# Patient Record
Sex: MALE | Race: WHITE | NOT HISPANIC OR LATINO | Employment: OTHER | URBAN - NONMETROPOLITAN AREA
[De-identification: names, ages, dates, MRNs, and addresses within clinical notes are randomized per-mention and may not be internally consistent; named-entity substitution may affect disease eponyms.]

---

## 2023-02-14 DIAGNOSIS — N20.0 KIDNEY STONE: Primary | ICD-10-CM

## 2023-02-14 NOTE — H&P (VIEW-ONLY)
"Chief Complaint  Left renal calculus  Subjective          Shon Ricks presents to [facility] [place]   Patient has left renal calculus.  She is sent here today by MELINA Saini  in [place].  5 days ago he presented to the emergency room with colic.  He did get IV analgesics which controlled his pain.  He had a CT which I will discuss below.  Presently he is in no pain at all.  He has not had any gross hematuria or fever.        Current Outpatient Medications:   •  tamsulosin (FLOMAX) 0.4 MG capsule 24 hr capsule, TAKE 1 CAPSULE BY MOUTH EVERY DAY 30 MINUTES AFTER THE SAME MEAL EACH DAY, Disp: , Rfl:   Past Medical History:   Diagnosis Date   • Kidney stone 2007    First occurrence, second 2016     History reviewed. No pertinent surgical history.        Review of Systems  Review  of systems  Constitutional: Negative for chills and fever.   Gastrointestinal: Negative for abdominal pain, anal bleeding and blood in stool.   Genitourinary: Negative for flank pain and hematuria.      Objective   PHYSICAL EXAM  Vital Signs:   Ht 185.4 cm (73\")   Wt 91.7 kg (202 lb 3.2 oz)   BMI 26.68 kg/m²     Physical Exam  Constitutional: Patient is without distress or deformity.  Vital signs are reviewed as above.    Neuro: No confusion; No disorientation; Alert and oriented  Pulmonary: No respiratory distress.   Skin: No pallor or diaphoresis  GI: Soft. The patient exhibits no distension and no mass. There is no tenderness. There is no rebound and no guarding. No hernia.   : No CVA tenderness over kidneys.  Bladder not palpably distended.  Psychiatric:  normal mood and affect. Not agitated.           DATA  Result Review :              Results for orders placed or performed in visit on 02/16/23   POC Urinalysis Dipstick, Multipro    Specimen: Urine   Result Value Ref Range    Color Yellow Yellow, Straw, Dark Yellow, Vera    Clarity, UA Clear Clear    Glucose, UA Negative Negative mg/dL    Bilirubin " "Negative Negative    Ketones, UA Negative Negative    Specific Gravity  1.025 1.005 - 1.030    Blood, UA Large (A) Negative    pH, Urine 5.5 5.0 - 8.0    Protein, POC Trace (A) Negative mg/dL    Urobilinogen, UA Normal Normal, 0.2 E.U./dL    Nitrite, UA Negative Negative    Leukocytes Negative Negative               XR Abdomen KUB (02/16/2023 07:58)    The images for the above \"link(s)\" were made available to me to review independently.  I also reviewed the radiologist's report described above with regard to the urologic findings. My interpretation is as follows:  This stone is radiopaque.   /Rigo Stewart MD           ASSESSMENT AND PLAN          Problem List Items Addressed This Visit    None  Visit Diagnoses     Renal calculus, left    -  Primary    Relevant Orders    POC Urinalysis Dipstick, Multipro (Completed)    Case Request (Completed)      The patient has a left renal calculus.   Additional evaluation will include a postoperative kub to assess stone free status, stone analysis if patient able to strain for stone, and dietary assessment with potential recommendations for pharmacologic therapy as indicated. The radiographs, including size, location, and symptoms are discussed with the patient.  Options for management including ureteroscopic, open, and percutaneous approaches are explained to the patient.  The risks, benefits, and alternatives of this procedure have been discussed with the patient or the responsible party.  Mr. Ricks  has chosen to proceed with ESWL due to its less invasive approach combined with its effectiveness.  Understanding of the risks including damage to the kidney (even long term), inability to render stone free, need for ancillary procedures, bleeding, infection, steinstrasse, post op pain and discomfort,  and possible arrhythmia is expressed by the patient.  Given the size and location of the stone, the patient will undergo placement of a ureteral stent to help with passage of " the stones and avoiding ureteral obstruction by fragments.  It is explained to the patient that the stent includes extra risks, including damage to the urethra, bladder, or ureter, along with post operative pain caused by stent colic. It is explained to the patient that the stent will have to be removed in the future and that failure to follow up for stent removal could results in infection further stone formation, permanent kidney damage, and need for further surgery..  The patient also understands the spontaneous passage of the stone is an option, or even following the stone size and location with serial imaging.     Data Reviewed and/or ordered during today's visit to evaluate and manage:   DATA ORDERED/REVIEWED THIS VISIT: URINALYSIS, CT STONE PROTOCOL and KUB        FOLLOW UP     No follow-ups on file.        (Please note that portions of this note were completed with a voice recognition program.)  Rigo Stewart MD  02/16/23  09:24 CST

## 2023-02-14 NOTE — PROGRESS NOTES
"Chief Complaint  Left renal calculus  Subjective          Shon Ricks presents to [facility] [place]   Patient has left renal calculus.  She is sent here today by MELINA Saini  in [place].  5 days ago he presented to the emergency room with colic.  He did get IV analgesics which controlled his pain.  He had a CT which I will discuss below.  Presently he is in no pain at all.  He has not had any gross hematuria or fever.        Current Outpatient Medications:   •  tamsulosin (FLOMAX) 0.4 MG capsule 24 hr capsule, TAKE 1 CAPSULE BY MOUTH EVERY DAY 30 MINUTES AFTER THE SAME MEAL EACH DAY, Disp: , Rfl:   Past Medical History:   Diagnosis Date   • Kidney stone 2007    First occurrence, second 2016     History reviewed. No pertinent surgical history.        Review of Systems  Review  of systems  Constitutional: Negative for chills and fever.   Gastrointestinal: Negative for abdominal pain, anal bleeding and blood in stool.   Genitourinary: Negative for flank pain and hematuria.      Objective   PHYSICAL EXAM  Vital Signs:   Ht 185.4 cm (73\")   Wt 91.7 kg (202 lb 3.2 oz)   BMI 26.68 kg/m²     Physical Exam  Constitutional: Patient is without distress or deformity.  Vital signs are reviewed as above.    Neuro: No confusion; No disorientation; Alert and oriented  Pulmonary: No respiratory distress.   Skin: No pallor or diaphoresis  GI: Soft. The patient exhibits no distension and no mass. There is no tenderness. There is no rebound and no guarding. No hernia.   : No CVA tenderness over kidneys.  Bladder not palpably distended.  Psychiatric:  normal mood and affect. Not agitated.           DATA  Result Review :              Results for orders placed or performed in visit on 02/16/23   POC Urinalysis Dipstick, Multipro    Specimen: Urine   Result Value Ref Range    Color Yellow Yellow, Straw, Dark Yellow, Vera    Clarity, UA Clear Clear    Glucose, UA Negative Negative mg/dL    Bilirubin " "Negative Negative    Ketones, UA Negative Negative    Specific Gravity  1.025 1.005 - 1.030    Blood, UA Large (A) Negative    pH, Urine 5.5 5.0 - 8.0    Protein, POC Trace (A) Negative mg/dL    Urobilinogen, UA Normal Normal, 0.2 E.U./dL    Nitrite, UA Negative Negative    Leukocytes Negative Negative               XR Abdomen KUB (02/16/2023 07:58)    The images for the above \"link(s)\" were made available to me to review independently.  I also reviewed the radiologist's report described above with regard to the urologic findings. My interpretation is as follows:  This stone is radiopaque.   /Rigo Stewart MD           ASSESSMENT AND PLAN          Problem List Items Addressed This Visit    None  Visit Diagnoses     Renal calculus, left    -  Primary    Relevant Orders    POC Urinalysis Dipstick, Multipro (Completed)    Case Request (Completed)      The patient has a left renal calculus.   Additional evaluation will include a postoperative kub to assess stone free status, stone analysis if patient able to strain for stone, and dietary assessment with potential recommendations for pharmacologic therapy as indicated. The radiographs, including size, location, and symptoms are discussed with the patient.  Options for management including ureteroscopic, open, and percutaneous approaches are explained to the patient.  The risks, benefits, and alternatives of this procedure have been discussed with the patient or the responsible party.  Mr. Ricks  has chosen to proceed with ESWL due to its less invasive approach combined with its effectiveness.  Understanding of the risks including damage to the kidney (even long term), inability to render stone free, need for ancillary procedures, bleeding, infection, steinstrasse, post op pain and discomfort,  and possible arrhythmia is expressed by the patient.  Given the size and location of the stone, the patient will undergo placement of a ureteral stent to help with passage of " the stones and avoiding ureteral obstruction by fragments.  It is explained to the patient that the stent includes extra risks, including damage to the urethra, bladder, or ureter, along with post operative pain caused by stent colic. It is explained to the patient that the stent will have to be removed in the future and that failure to follow up for stent removal could results in infection further stone formation, permanent kidney damage, and need for further surgery..  The patient also understands the spontaneous passage of the stone is an option, or even following the stone size and location with serial imaging.     Data Reviewed and/or ordered during today's visit to evaluate and manage:   DATA ORDERED/REVIEWED THIS VISIT: URINALYSIS, CT STONE PROTOCOL and KUB        FOLLOW UP     No follow-ups on file.        (Please note that portions of this note were completed with a voice recognition program.)  Rigo Stewart MD  02/16/23  09:24 CST

## 2023-02-16 ENCOUNTER — PRE-ADMISSION TESTING (OUTPATIENT)
Dept: PREADMISSION TESTING | Facility: HOSPITAL | Age: 58
End: 2023-02-16
Payer: COMMERCIAL

## 2023-02-16 ENCOUNTER — OFFICE VISIT (OUTPATIENT)
Dept: UROLOGY | Facility: CLINIC | Age: 58
End: 2023-02-16
Payer: COMMERCIAL

## 2023-02-16 ENCOUNTER — HOSPITAL ENCOUNTER (OUTPATIENT)
Dept: GENERAL RADIOLOGY | Facility: HOSPITAL | Age: 58
Discharge: HOME | End: 2023-02-16
Payer: COMMERCIAL

## 2023-02-16 VITALS
HEART RATE: 70 BPM | SYSTOLIC BLOOD PRESSURE: 147 MMHG | RESPIRATION RATE: 18 BRPM | OXYGEN SATURATION: 98 % | HEIGHT: 73 IN | DIASTOLIC BLOOD PRESSURE: 103 MMHG | BODY MASS INDEX: 26.79 KG/M2 | WEIGHT: 202.16 LBS

## 2023-02-16 VITALS — BODY MASS INDEX: 26.8 KG/M2 | WEIGHT: 202.2 LBS | HEIGHT: 73 IN

## 2023-02-16 DIAGNOSIS — N20.0 KIDNEY STONE: ICD-10-CM

## 2023-02-16 DIAGNOSIS — N20.0 RENAL CALCULUS, LEFT: Primary | ICD-10-CM

## 2023-02-16 LAB
BILIRUB BLD-MCNC: NEGATIVE MG/DL
CLARITY, POC: CLEAR
COLOR UR: YELLOW
DEPRECATED RDW RBC AUTO: 44.7 FL (ref 37–54)
ERYTHROCYTE [DISTWIDTH] IN BLOOD BY AUTOMATED COUNT: 12.8 % (ref 12.3–15.4)
GLUCOSE UR STRIP-MCNC: NEGATIVE MG/DL
HCT VFR BLD AUTO: 44.4 % (ref 37.5–51)
HGB BLD-MCNC: 14.2 G/DL (ref 13–17.7)
KETONES UR QL: NEGATIVE
LEUKOCYTE EST, POC: NEGATIVE
MCH RBC QN AUTO: 30.4 PG (ref 26.6–33)
MCHC RBC AUTO-ENTMCNC: 32 G/DL (ref 31.5–35.7)
MCV RBC AUTO: 95.1 FL (ref 79–97)
NITRITE UR-MCNC: NEGATIVE MG/ML
PH UR: 5.5 [PH] (ref 5–8)
PLATELET # BLD AUTO: 212 10*3/MM3 (ref 140–450)
PMV BLD AUTO: 10.4 FL (ref 6–12)
PROT UR STRIP-MCNC: ABNORMAL MG/DL
RBC # BLD AUTO: 4.67 10*6/MM3 (ref 4.14–5.8)
RBC # UR STRIP: ABNORMAL /UL
SP GR UR: 1.02 (ref 1–1.03)
UROBILINOGEN UR QL: NORMAL
WBC NRBC COR # BLD: 4.72 10*3/MM3 (ref 3.4–10.8)

## 2023-02-16 PROCEDURE — 85027 COMPLETE CBC AUTOMATED: CPT

## 2023-02-16 PROCEDURE — 36415 COLL VENOUS BLD VENIPUNCTURE: CPT

## 2023-02-16 PROCEDURE — 99204 OFFICE O/P NEW MOD 45 MIN: CPT | Performed by: UROLOGY

## 2023-02-16 PROCEDURE — 81003 URINALYSIS AUTO W/O SCOPE: CPT | Performed by: UROLOGY

## 2023-02-16 PROCEDURE — 74018 RADEX ABDOMEN 1 VIEW: CPT

## 2023-02-16 RX ORDER — TAMSULOSIN HYDROCHLORIDE 0.4 MG/1
1 CAPSULE ORAL AS NEEDED
COMMUNITY
Start: 2023-02-13

## 2023-02-16 RX ORDER — KETOROLAC TROMETHAMINE 10 MG/1
10 TABLET, FILM COATED ORAL EVERY 6 HOURS PRN
COMMUNITY

## 2023-02-16 NOTE — DISCHARGE INSTRUCTIONS
Before you come to the hospital        Arrival time: AS DIRECTED BY OFFICE     YOU MAY TAKE THE FOLLOWING MEDICATION(S) THE MORNING OF SURGERY WITH A SIP OF WATER: ***none           ALL OTHER HOME MEDICATION CHECK WITH YOUR PHYSICIAN (especially if   you are taking diabetes medicines or blood thinners)    Do not take any Erectile Dysfunction medications (EX: CIALIS, VIAGRA) 24 hours prior to surgery.                  Eating and drinking restrictions prior to scheduled arrival time    2 Hours before arrival time STOP   Drinking Clear liquids (water, apple juice-no pulp)     6 Hours before arrival time STOP   Milk or drinks that contain milk, full liquids    6 Hours before arrival time STOP   Light meals or foods, such as toast or cereal    8 Hours before arrival time STOP   Heavy foods, such as meat, fried foods, or fatty foods    (It is extremely important that you follow these guidelines to prevent delay or cancelation of your procedure)     Clear Liquids  Water and flavored water                                                                      Clear Fruit juices, such as cranberry juice and apple juice.  Black coffee (NO cream of any kind, including powdered).  Plain tea  Clear bouillon or broth.  Flavored gelatin.  Soda.  Gatorade or Powerade.  Full liquid examples  Juices that have pulp.  Frozen ice pops that contain fruit pieces.  Coffee with creamer  Milk.  Yogurt.                MANAGING PAIN AFTER SURGERY    We know you are probably wondering what your pain will be like after surgery.  Following surgery it is unrealistic to expect you will not have pain.   Pain is how our bodies let us know that something is wrong or cautions us to be careful.  That said, our goal is to make your pain tolerable.    Methods we may use to treat your pain include (oral or IV medications, PCAs, epidurals, nerve blocks, etc.)   While some procedures require IV pain medications for a short time after surgery, transitioning to  pain medications by mouth allows for better management of pain.   Your nurse will encourage you to take oral pain medications whenever possible.  IV medications work almost immediately, but only last a short while.  Taking medications by mouth allows for a more constant level of medication in your blood stream for a longer period of time.      Once your pain is out of control it is harder to get back under control.  It is important you are aware when your next dose of pain medication is due.  If you are admitted, your nurse may write the time of your next dose on the white board in your room to help you remember.      We are interested in your pain and encourage you to inform us about aggravating factors during your visit.   Many times a simple repositioning every few hours can make a big difference.    If your physician says it is okay, do not let your pain prevent you from getting out of bed. Be sure to call your nurse for assistance prior to getting up so you do not fall.      Before surgery, please decide your tolerable pain goal.  These faces help describe the pain ratings we use on a 0-10 scale.   Be prepared to tell us your goal and whether or not you take pain or anxiety medications at home.          Preparing for Surgery  Preparing for surgery is an important part of your care. It can make things go more smoothly and help you avoid complications. The steps leading up to surgery may vary among hospitals. Follow all instructions given to you by your health care providers. Ask questions if you do not understand something. Talk about any concerns that you have.  Here are some questions to consider asking before your surgery:  If my surgery is not an emergency (is elective), when would be the best time to have the surgery?  What arrangements do I need to make for work, home, or school?  What will my recovery be like? How long will it be before I can return to normal activities?  Will I need to prepare my home?  Will I need to arrange care for me or my children?  Should I expect to have pain after surgery? What are my pain management options? Are there nonmedical options that I can try for pain?  Tell a health care provider about:  Any allergies you have.  All medicines you are taking, including vitamins, herbs, eye drops, creams, and over-the-counter medicines.  Any problems you or family members have had with anesthetic medicines.  Any blood disorders you have.  Any surgeries you have had.  Any medical conditions you have.  Whether you are pregnant or may be pregnant.  What are the risks?  The risks and complications of surgery depend on the specific procedure that you have. Discuss all the risks with your health care providers before your surgery. Ask about common surgical complications, which may include:  Infection.  Bleeding or a need for blood replacement (transfusion).  Allergic reactions to medicines.  Damage to surrounding nerves, tissues, or structures.  A blood clot.  Scarring.  Failure of the surgery to correct the problem.  Follow these instructions before the procedure:  Several days or weeks before your procedure  You may have a physical exam by your primary health care provider to make sure it is safe for you to have surgery.  You may have testing. This may include a chest X-ray, blood and urine tests, electrocardiogram (ECG), or other testing.  Ask your health care provider about:  Changing or stopping your regular medicines. This is especially important if you are taking diabetes medicines or blood thinners.  Taking medicines such as aspirin and ibuprofen. These medicines can thin your blood. Do not take these medicines unless your health care provider tells you to take them.  Taking over-the-counter medicines, vitamins, herbs, and supplements.  Do not use any products that contain nicotine or tobacco, such as cigarettes and e-cigarettes. If you need help quitting, ask your health care provider.  Avoid  alcohol.  Ask your health care provider if there are exercises you can do to prepare for surgery.  Eat a healthy diet.   Plan to have someone take you home from the hospital or clinic.  Plan to have a responsible adult care for you for at least 24 hours after you leave the hospital or clinic. This is important.  The day before your procedure  You may be given antibiotic medicine to take by mouth to help prevent infection. Take it as told by your health care provider.  You may be asked to shower with a germ-killing soap.  Follow instructions from your health care provider about eating and drinking restrictions. This includes gum, mints and hard candy.  Pack comfortable clothes according to your procedure.   The day of your procedure  You may need to take another shower with a germ-killing soap before you leave home in the morning.  With a small sip of water, take only the medicines that you are told to take.  Remove all jewelry including rings.   Leave anything you consider valuable at home except hearing aids if needed.  You do not need to bring your home medications into the hospital.   Do not wear any makeup, nail polish, powder, deodorant, lotion, hair accessories, or anything on your skin or body except your clothes.  If you will be staying in the hospital, bring a case to hold your glasses, contacts, or dentures. You may also want to bring your robe and non-skid footwear.       (Do not use denture adhesives since you will be asked to remove them during  surgery).   If you wear oxygen at home, bring it with you the day of surgery.  If instructed by your health care provider, bring your sleep apnea device with you on the day of your surgery (if this applies to you).  You may want to leave your suitcase and sleep apnea device in the car until after surgery.   Arrive at the hospital as scheduled.  Bring a friend or family member with you who can help to answer questions and be present while you meet with your  health care provider.  At the hospital  When you arrive at the hospital:  Go to registration located at the main entrance of the hospital. You will be registered and given a beeper and a sticker sheet. Take the stickers to the Outpatient nurses desk and place in the black tray. This is to notify staff that you have arrived. Then return to the lobby to wait.   When your beeper lights up and vibrates proceed through the double doors, under the stairs, and a member of the Outpatient Surgery staff will escort you to your preoperative room.  You may have to wear compression sleeves. These help to prevent blood clots and reduce swelling in your legs.  An IV may be inserted into one of your veins.              In the operating room, you may be given one or more of the following:        A medicine to help you relax (sedative).        A medicine to numb the area (local anesthetic).        A medicine to make you fall asleep (general anesthetic).        A medicine that is injected into an area of your body to numb everything below the                      injection site (regional anesthetic).  You may be given an antibiotic through your IV to help prevent infection.  Your surgical site will be marked or identified.    Contact a health care provider if you:  Develop a fever of more than 100.4°F (38°C) or other feelings of illness during the 48 hours before your surgery.  Have symptoms that get worse.  Have questions or concerns about your surgery.  Summary  Preparing for surgery can make the procedure go more smoothly and lower your risk of complications.  Before surgery, make a list of questions and concerns to discuss with your surgeon. Ask about the risks and possible complications.  In the days or weeks before your surgery, follow all instructions from your health care provider. You may need to stop smoking, avoid alcohol, follow eating restrictions, and change or stop your regular medicines.  Contact your surgeon if you  develop a fever or other signs of illness during the few days before your surgery.  This information is not intended to replace advice given to you by your health care provider. Make sure you discuss any questions you have with your health care provider.  Document Revised: 12/21/2018 Document Reviewed: 10/23/2018  Elsevier Patient Education © 2021 [employer]

## 2023-02-20 ENCOUNTER — ANESTHESIA EVENT (OUTPATIENT)
Dept: PERIOP | Facility: HOSPITAL | Age: 58
End: 2023-02-20
Payer: COMMERCIAL

## 2023-02-20 ENCOUNTER — HOSPITAL ENCOUNTER (OUTPATIENT)
Facility: HOSPITAL | Age: 58
Setting detail: HOSPITAL OUTPATIENT SURGERY
Discharge: HOME | End: 2023-02-20
Attending: UROLOGY | Admitting: UROLOGY
Payer: COMMERCIAL

## 2023-02-20 ENCOUNTER — APPOINTMENT (OUTPATIENT)
Dept: GENERAL RADIOLOGY | Facility: HOSPITAL | Age: 58
End: 2023-02-20
Payer: COMMERCIAL

## 2023-02-20 ENCOUNTER — ANESTHESIA (OUTPATIENT)
Dept: PERIOP | Facility: HOSPITAL | Age: 58
End: 2023-02-20
Payer: COMMERCIAL

## 2023-02-20 VITALS
HEART RATE: 52 BPM | TEMPERATURE: 97.2 F | RESPIRATION RATE: 20 BRPM | OXYGEN SATURATION: 100 % | SYSTOLIC BLOOD PRESSURE: 154 MMHG | DIASTOLIC BLOOD PRESSURE: 101 MMHG

## 2023-02-20 DIAGNOSIS — N20.0 RENAL CALCULUS, LEFT: ICD-10-CM

## 2023-02-20 DIAGNOSIS — N20.0 KIDNEY STONE: Primary | ICD-10-CM

## 2023-02-20 PROCEDURE — 25010000002 CEFAZOLIN PER 500 MG: Performed by: UROLOGY

## 2023-02-20 PROCEDURE — 52332 CYSTOSCOPY AND TREATMENT: CPT | Performed by: UROLOGY

## 2023-02-20 PROCEDURE — C2617 STENT, NON-COR, TEM W/O DEL: HCPCS | Performed by: UROLOGY

## 2023-02-20 PROCEDURE — 74018 RADEX ABDOMEN 1 VIEW: CPT

## 2023-02-20 PROCEDURE — 25010000002 FENTANYL CITRATE (PF) 100 MCG/2ML SOLUTION: Performed by: NURSE ANESTHETIST, CERTIFIED REGISTERED

## 2023-02-20 PROCEDURE — 25010000002 PROPOFOL 10 MG/ML EMULSION: Performed by: NURSE ANESTHETIST, CERTIFIED REGISTERED

## 2023-02-20 PROCEDURE — 50590 FRAGMENTING OF KIDNEY STONE: CPT | Performed by: UROLOGY

## 2023-02-20 DEVICE — URETERAL STENT
Type: IMPLANTABLE DEVICE | Site: URETER | Status: FUNCTIONAL
Brand: PERCUFLEX™ PLUS

## 2023-02-20 RX ORDER — ONDANSETRON 2 MG/ML
4 INJECTION INTRAMUSCULAR; INTRAVENOUS ONCE AS NEEDED
Status: DISCONTINUED | OUTPATIENT
Start: 2023-02-20 | End: 2023-02-20 | Stop reason: HOSPADM

## 2023-02-20 RX ORDER — IBUPROFEN 600 MG/1
600 TABLET ORAL ONCE AS NEEDED
Status: DISCONTINUED | OUTPATIENT
Start: 2023-02-20 | End: 2023-02-20 | Stop reason: HOSPADM

## 2023-02-20 RX ORDER — DROPERIDOL 2.5 MG/ML
0.62 INJECTION, SOLUTION INTRAMUSCULAR; INTRAVENOUS ONCE AS NEEDED
Status: DISCONTINUED | OUTPATIENT
Start: 2023-02-20 | End: 2023-02-20 | Stop reason: HOSPADM

## 2023-02-20 RX ORDER — SODIUM CHLORIDE 0.9 % (FLUSH) 0.9 %
3 SYRINGE (ML) INJECTION AS NEEDED
Status: DISCONTINUED | OUTPATIENT
Start: 2023-02-20 | End: 2023-02-20 | Stop reason: HOSPADM

## 2023-02-20 RX ORDER — HYDROCODONE BITARTRATE AND ACETAMINOPHEN 5; 325 MG/1; MG/1
1 TABLET ORAL ONCE AS NEEDED
Status: DISCONTINUED | OUTPATIENT
Start: 2023-02-20 | End: 2023-02-20 | Stop reason: HOSPADM

## 2023-02-20 RX ORDER — MAGNESIUM HYDROXIDE 1200 MG/15ML
LIQUID ORAL AS NEEDED
Status: DISCONTINUED | OUTPATIENT
Start: 2023-02-20 | End: 2023-02-20 | Stop reason: HOSPADM

## 2023-02-20 RX ORDER — SODIUM CHLORIDE, SODIUM LACTATE, POTASSIUM CHLORIDE, CALCIUM CHLORIDE 600; 310; 30; 20 MG/100ML; MG/100ML; MG/100ML; MG/100ML
100 INJECTION, SOLUTION INTRAVENOUS CONTINUOUS
Status: DISCONTINUED | OUTPATIENT
Start: 2023-02-20 | End: 2023-02-20 | Stop reason: HOSPADM

## 2023-02-20 RX ORDER — LABETALOL HYDROCHLORIDE 5 MG/ML
5 INJECTION, SOLUTION INTRAVENOUS
Status: DISCONTINUED | OUTPATIENT
Start: 2023-02-20 | End: 2023-02-20 | Stop reason: HOSPADM

## 2023-02-20 RX ORDER — OXYCODONE HYDROCHLORIDE AND ACETAMINOPHEN 5; 325 MG/1; MG/1
1 TABLET ORAL EVERY 6 HOURS PRN
Qty: 12 TABLET | Refills: 0 | Status: SHIPPED | OUTPATIENT
Start: 2023-02-20 | End: 2023-02-23

## 2023-02-20 RX ORDER — LIDOCAINE HYDROCHLORIDE 20 MG/ML
INJECTION, SOLUTION EPIDURAL; INFILTRATION; INTRACAUDAL; PERINEURAL AS NEEDED
Status: DISCONTINUED | OUTPATIENT
Start: 2023-02-20 | End: 2023-02-20 | Stop reason: SURG

## 2023-02-20 RX ORDER — SODIUM CHLORIDE 0.9 % (FLUSH) 0.9 %
3-10 SYRINGE (ML) INJECTION AS NEEDED
Status: DISCONTINUED | OUTPATIENT
Start: 2023-02-20 | End: 2023-02-20 | Stop reason: HOSPADM

## 2023-02-20 RX ORDER — SODIUM CHLORIDE, SODIUM LACTATE, POTASSIUM CHLORIDE, CALCIUM CHLORIDE 600; 310; 30; 20 MG/100ML; MG/100ML; MG/100ML; MG/100ML
1000 INJECTION, SOLUTION INTRAVENOUS CONTINUOUS
Status: DISCONTINUED | OUTPATIENT
Start: 2023-02-20 | End: 2023-02-20 | Stop reason: HOSPADM

## 2023-02-20 RX ORDER — BUPIVACAINE HCL/0.9 % NACL/PF 0.1 %
2 PLASTIC BAG, INJECTION (ML) EPIDURAL ONCE
Status: COMPLETED | OUTPATIENT
Start: 2023-02-20 | End: 2023-02-20

## 2023-02-20 RX ORDER — PROPOFOL 10 MG/ML
VIAL (ML) INTRAVENOUS AS NEEDED
Status: DISCONTINUED | OUTPATIENT
Start: 2023-02-20 | End: 2023-02-20 | Stop reason: SURG

## 2023-02-20 RX ORDER — OXYCODONE AND ACETAMINOPHEN 7.5; 325 MG/1; MG/1
2 TABLET ORAL EVERY 4 HOURS PRN
Status: DISCONTINUED | OUTPATIENT
Start: 2023-02-20 | End: 2023-02-20 | Stop reason: HOSPADM

## 2023-02-20 RX ORDER — LIDOCAINE HYDROCHLORIDE 10 MG/ML
0.5 INJECTION, SOLUTION EPIDURAL; INFILTRATION; INTRACAUDAL; PERINEURAL ONCE AS NEEDED
Status: DISCONTINUED | OUTPATIENT
Start: 2023-02-20 | End: 2023-02-20 | Stop reason: HOSPADM

## 2023-02-20 RX ORDER — EPHEDRINE SULFATE 50 MG/ML
INJECTION, SOLUTION INTRAVENOUS AS NEEDED
Status: DISCONTINUED | OUTPATIENT
Start: 2023-02-20 | End: 2023-02-20 | Stop reason: SURG

## 2023-02-20 RX ORDER — FENTANYL CITRATE 50 UG/ML
25 INJECTION, SOLUTION INTRAMUSCULAR; INTRAVENOUS
Status: DISCONTINUED | OUTPATIENT
Start: 2023-02-20 | End: 2023-02-20 | Stop reason: HOSPADM

## 2023-02-20 RX ORDER — NALOXONE HCL 0.4 MG/ML
0.4 VIAL (ML) INJECTION AS NEEDED
Status: DISCONTINUED | OUTPATIENT
Start: 2023-02-20 | End: 2023-02-20 | Stop reason: HOSPADM

## 2023-02-20 RX ORDER — SODIUM CHLORIDE 0.9 % (FLUSH) 0.9 %
3 SYRINGE (ML) INJECTION EVERY 12 HOURS SCHEDULED
Status: DISCONTINUED | OUTPATIENT
Start: 2023-02-20 | End: 2023-02-20 | Stop reason: HOSPADM

## 2023-02-20 RX ORDER — OXYCODONE AND ACETAMINOPHEN 10; 325 MG/1; MG/1
1 TABLET ORAL ONCE AS NEEDED
Status: DISCONTINUED | OUTPATIENT
Start: 2023-02-20 | End: 2023-02-20 | Stop reason: HOSPADM

## 2023-02-20 RX ORDER — ACETAMINOPHEN 500 MG
1000 TABLET ORAL ONCE
Status: COMPLETED | OUTPATIENT
Start: 2023-02-20 | End: 2023-02-20

## 2023-02-20 RX ORDER — FLUMAZENIL 0.1 MG/ML
0.2 INJECTION INTRAVENOUS AS NEEDED
Status: DISCONTINUED | OUTPATIENT
Start: 2023-02-20 | End: 2023-02-20 | Stop reason: HOSPADM

## 2023-02-20 RX ORDER — SODIUM CHLORIDE 9 MG/ML
40 INJECTION, SOLUTION INTRAVENOUS AS NEEDED
Status: DISCONTINUED | OUTPATIENT
Start: 2023-02-20 | End: 2023-02-20 | Stop reason: HOSPADM

## 2023-02-20 RX ORDER — FENTANYL CITRATE 50 UG/ML
INJECTION, SOLUTION INTRAMUSCULAR; INTRAVENOUS AS NEEDED
Status: DISCONTINUED | OUTPATIENT
Start: 2023-02-20 | End: 2023-02-20 | Stop reason: SURG

## 2023-02-20 RX ORDER — MIDAZOLAM HYDROCHLORIDE 1 MG/ML
1 INJECTION INTRAMUSCULAR; INTRAVENOUS
Status: DISCONTINUED | OUTPATIENT
Start: 2023-02-20 | End: 2023-02-20 | Stop reason: HOSPADM

## 2023-02-20 RX ORDER — ONDANSETRON 4 MG/1
4 TABLET, FILM COATED ORAL ONCE AS NEEDED
Status: DISCONTINUED | OUTPATIENT
Start: 2023-02-20 | End: 2023-02-20 | Stop reason: HOSPADM

## 2023-02-20 RX ORDER — HYDROCODONE BITARTRATE AND ACETAMINOPHEN 5; 325 MG/1; MG/1
1 TABLET ORAL EVERY 6 HOURS PRN
Qty: 12 TABLET | Refills: 0 | Status: SHIPPED | OUTPATIENT
Start: 2023-02-20 | End: 2023-02-23

## 2023-02-20 RX ADMIN — LIDOCAINE HYDROCHLORIDE 100 MG: 20 INJECTION, SOLUTION EPIDURAL; INFILTRATION; INTRACAUDAL; PERINEURAL at 10:41

## 2023-02-20 RX ADMIN — FENTANYL CITRATE 50 MCG: 50 INJECTION INTRAMUSCULAR; INTRAVENOUS at 10:40

## 2023-02-20 RX ADMIN — PROPOFOL 200 MG: 10 INJECTION, EMULSION INTRAVENOUS at 10:41

## 2023-02-20 RX ADMIN — EPHEDRINE SULFATE 10 MG: 50 INJECTION INTRAVENOUS at 10:54

## 2023-02-20 RX ADMIN — ACETAMINOPHEN 1000 MG: 500 TABLET, FILM COATED ORAL at 09:54

## 2023-02-20 RX ADMIN — FENTANYL CITRATE 50 MCG: 50 INJECTION INTRAMUSCULAR; INTRAVENOUS at 10:45

## 2023-02-20 RX ADMIN — OXYCODONE AND ACETAMINOPHEN 2 TABLET: 7.5; 325 TABLET ORAL at 13:17

## 2023-02-20 RX ADMIN — EPHEDRINE SULFATE 5 MG: 50 INJECTION INTRAVENOUS at 10:55

## 2023-02-20 RX ADMIN — Medication 2 G: at 10:45

## 2023-02-20 RX ADMIN — SODIUM CHLORIDE, POTASSIUM CHLORIDE, SODIUM LACTATE AND CALCIUM CHLORIDE 1000 ML: 600; 310; 30; 20 INJECTION, SOLUTION INTRAVENOUS at 08:42

## 2023-02-20 NOTE — ANESTHESIA PROCEDURE NOTES
Airway  Urgency: elective    Date/Time: 2/20/2023 10:42 AM  Airway not difficult    General Information and Staff    Patient location during procedure: OR    Indications and Patient Condition  Indications for airway management: airway protection    Preoxygenated: yes  Mask difficulty assessment: 0 - not attempted    Final Airway Details  Final airway type: supraglottic airway      Successful airway: Size 4     Number of attempts at approach: 1  Assessment: lips, teeth, and gum same as pre-op and atraumatic intubation

## 2023-02-20 NOTE — OP NOTE
OP NOTE  Shon Ricks    Date of Procedure: 2/20/2023    Pre-op Diagnosis:   Kidney stone [N20.0]    Post-op Diagnosis:     Post-Op Diagnosis Codes:     * Kidney stone [N20.0]    Procedure/CPT® Codes:      Procedure(s):  EXTRACORPOREAL SHOCKWAVE LITHOTRIPSY LEFT,   CYSTOSCOPY WITH LEFT INDWELLING URETERAL STENT INSERTION      Surgeon(s):  Rigo Stewart MD    Anesthesia: General    Staff:   Circulator: Fariha Urbano RN  Scrub Person: Octavio Tidwell  Vendor Representative: Angel Pace    Indications:   Left renal calculus with intermittent UPJ obstruction secondary to size of the stone.   After discussion of options, patient has given informed consent to undergo left ESWL.  All risks, benefits, and alternatives were discussed.    Operative Report: The patient is identified. The KUB is reviewed. After answering any questions, patient was brought to the operating room and placed on the patient table of the [facility].  General endotracheal anesthesia was administered.  Preoperative KUB was reviewed.   An [facility] c-arm fluoroscope was used to identify the stone.    The patient dorsolithotomy a 23 Indonesian Hodgson cystoscope was passed.  The anterior urethra shows no stricture.  The prostatic urethra has lateral lobe enlargement but not a very obstructive appearance.  Upon entering the bladder there is no erythema or papillary mass noted.  The orifices orthotopic in position normal in their configuration.  The left orifice is cannulated with a 0.038 mm guidewire which is passed under both direct and fluoroscopic vision.  Once this was confirmed to be in the renal pelvis I passed a 6 Indonesian by 26 cm double-J stent over the guidewire curling 1 in the pelvis and in the bladder.  String is cut at the level of the external urethral meatus for ease of removal.    The shock-head is brought into position.  This stone is brought into the F2 plane for focus.  Treatment was initiated.  We gradually increased the energy level  from 1 to 7.  This stone was treated at a rate of 90.  We paused for 2 minutes after 300 shocks to reduce the risk of renal damage.  The stone was periodically checked to make sure that we were on it and getting good breakup.  We checked at 700, 1000, 1500, 2000, and 2500 shocks.  The stone did appear to have good breakup.  I felt it was unnecessary to place a ureteral stent.  At the conclusion of 3000 shocks, the patient was awakened from anesthesia and transferred to the recovery room in satisfactory condition.      Estimated Blood Loss: <30 mL    Specimens:                None      Drains:   Ureteral Drain/Stent Left ureter 6 Fr. (Active)       Complications: none  Plan: Repeat KUB in a few weeks. Home today if no evidence of infection or significant bleeding.     Rigo Stewart MD     Date: 2/20/2023  Time: 11:49 CST

## 2023-02-20 NOTE — ANESTHESIA PREPROCEDURE EVALUATION
Anesthesia Evaluation     Patient summary reviewed and Nursing notes reviewed   no history of anesthetic complications:  NPO Solid Status: > 8 hours  NPO Liquid Status: > 8 hours           Airway   Mallampati: I  No difficulty expected  Dental      Pulmonary    Cardiovascular   Exercise tolerance: good (4-7 METS)        Neuro/Psych  GI/Hepatic/Renal/Endo    (+)   renal disease stones,     Musculoskeletal     Abdominal    Substance History      OB/GYN          Other                        Anesthesia Plan    ASA 1     general     intravenous induction     Anesthetic plan, risks, benefits, and alternatives have been provided, discussed and informed consent has been obtained with: patient.        CODE STATUS:

## 2023-02-20 NOTE — ANESTHESIA POSTPROCEDURE EVALUATION
Patient: Shon Ricks    Procedure Summary     Date: 02/20/23 Room / Location:  [facility] 09 /  [facility]    Anesthesia Start: 1039 Anesthesia Stop: 1146    Procedures:       EXTRACORPOREAL SHOCKWAVE LITHOTRIPSY LEFT, CYSTOSCOPY WITH LEFT URETERAL STENT INSERTION (Left)      CYSTOSCOPY URETERAL CATHETER/STENT INSERTION (Left: Bladder) Diagnosis:       Kidney stone      (Kidney stone [N20.0])    Surgeons: Rigo Stewart MD Provider: Alyssa Cintron CRNA    Anesthesia Type: general ASA Status: 1          Anesthesia Type: general    Vitals  Vitals Value Taken Time   /105 02/20/23 1221   Temp 97.2 °F (36.2 °C) 02/20/23 1215   Pulse 53 02/20/23 1226   Resp 16 02/20/23 1215   SpO2 96 % 02/20/23 1225   Vitals shown include unvalidated device data.        Post Anesthesia Care and Evaluation    Patient location during evaluation: PACU  Patient participation: complete - patient participated  Level of consciousness: awake and alert  Pain management: adequate    Airway patency: patent  Anesthetic complications: No anesthetic complications    Cardiovascular status: acceptable  Respiratory status: acceptable  Hydration status: acceptable    Comments: Blood pressure (!) 154/101, pulse 52, temperature 97.2 °F (36.2 °C), temperature source Temporal, resp. rate 20, SpO2 100 %.    Pt discharged from PACU based on bill score >8

## 2023-02-21 ENCOUNTER — TELEPHONE (OUTPATIENT)
Dept: UROLOGY | Facility: CLINIC | Age: 58
End: 2023-02-21
Payer: COMMERCIAL

## 2023-02-21 DIAGNOSIS — N20.0 KIDNEY STONE: Primary | ICD-10-CM

## 2023-02-21 NOTE — TELEPHONE ENCOUNTER
Caller: ROSANNA HWALEN     Relationship: [unfilled] [employer]    Best call back number: [phone]    What is your medical concern? [facility] DOES NOT HAVE MEDICATION PRESCRIBED. [facility] [place] #[phone] HAS THIS MEDICATION AVAILABLE. PLEASE SEND SCRIPT TO THIS PHARMACY. PLEASE CALL TO PT WHEN ORDER HAS BEEN PLACED. IT IS OK TO LEAVE MESSAGE IF NO ANSWER. THANK YOU.

## 2023-02-23 ENCOUNTER — PROCEDURE VISIT (OUTPATIENT)
Dept: UROLOGY | Facility: CLINIC | Age: 58
End: 2023-02-23
Payer: COMMERCIAL

## 2023-02-23 ENCOUNTER — HOSPITAL ENCOUNTER (OUTPATIENT)
Dept: GENERAL RADIOLOGY | Facility: HOSPITAL | Age: 58
Discharge: HOME | End: 2023-02-23
Admitting: UROLOGY
Payer: COMMERCIAL

## 2023-02-23 DIAGNOSIS — N20.0 KIDNEY STONE: ICD-10-CM

## 2023-02-23 DIAGNOSIS — N20.0 KIDNEY STONE: Primary | ICD-10-CM

## 2023-02-23 PROCEDURE — 52310 CYSTOSCOPY AND TREATMENT: CPT | Performed by: UROLOGY

## 2023-02-23 PROCEDURE — 74018 RADEX ABDOMEN 1 VIEW: CPT

## 2023-02-23 NOTE — PROGRESS NOTES
CC: I am here for the doctor to look at my bladder and get this stent out.     Cystoscopy with stent removal    Indications: Status post ESWL    Prep: Hibiclens solution    Instrumentation: Golden digital flexible cystoscope, Alligator forceps    Procedure: After lidocaine jelly is instilled into the urethra for 10 minutes, the well-lubricated cystoscope was introduced through the urethra into the bladder.  The stent is seen protruding from the left side.  The alligator forceps or used to grasp the stent and pull it out the urethra.  The patient tolerated the procedure well.       ASSESSMENT AND PLAN          Problem List Items Addressed This Visit        Genitourinary and Reproductive     Kidney stone - Primary    Overview     Added automatically from request for surgery [id number]         Relevant Orders    XR Abdomen KUB       Return in about 4 months (around 6/23/2023) for KUB before visit.      Rigo Stewart MD  2/23/2023  10:43 CST

## 2023-09-12 NOTE — PROGRESS NOTES
Chief Complaint  Renal Calculus    Subjective          Shon Ricks presents to [facility] UROLOGY   Patient has a an asymptomatic left renal calculus that is probably too large to pass on its own.  Patient has been motivated to have treatment for this since we last had to do the ESWL with a stent on him.  He would love to avoid stent placement if at all possible.  At present time he has no gross hematuria.  Flank pain is minimal.      No current outpatient medications on file.  Past Medical History:   Diagnosis Date    Kidney stone 2007    First occurrence, second 2016    Kidney stone 02/16/2023     Past Surgical History:   Procedure Laterality Date    CYSTOSCOPY W/ URETERAL STENT PLACEMENT Left 2/20/2023    Procedure: CYSTOSCOPY URETERAL CATHETER/STENT INSERTION;  Surgeon: Rigo Stewart MD;  Location: [facility] OR;  Service: Urology;  Laterality: Left;    EXTRACORPOREAL SHOCKWAVE LITHOTRIPSY (ESWL), STENT INSERTION/REMOVAL Left 2/20/2023    Procedure: EXTRACORPOREAL SHOCKWAVE LITHOTRIPSY LEFT, CYSTOSCOPY WITH LEFT URETERAL STENT INSERTION;  Surgeon: Rigo Stewart MD;  Location: [facility] OR;  Service: Urology;  Laterality: Left;    HERNIA REPAIR             Review of Systems      Objective   PHYSICAL EXAM  Vital Signs:   There were no vitals taken for this visit.    Physical Exam  Constitutional: Patient is without distress or deformity.  Vital signs are reviewed as above.    Neuro: No confusion; No disorientation; Alert and oriented  Pulmonary: No respiratory distress.   Skin: No pallor or diaphoresis  GI: Soft. The patient exhibits no distension and no mass. There is no tenderness. There is no rebound and no guarding. No hernia.   : No CVA tenderness over kidneys.  Bladder not palpably distended.  Psychiatric:  normal mood and affect. Not agitated.       DATA  Result Review :              Results for orders placed or performed in visit on 06/27/23   POC Urinalysis Dipstick, Multipro     "Specimen: Urine   Result Value Ref Range    Color Yellow Yellow, Straw, Dark Yellow, Vera    Clarity, UA Clear Clear    Glucose, UA Negative Negative mg/dL    Bilirubin Negative Negative    Ketones, UA Negative Negative    Specific Gravity  1.030 1.005 - 1.030    Blood, UA Negative Negative    pH, Urine 6.0 5.0 - 8.0    Protein, POC Negative Negative mg/dL    Urobilinogen, UA 0.2 E.U./dL Normal, 0.2 E.U./dL    Nitrite, UA Negative Negative    Leukocytes Negative Negative       XR Abdomen KUB (06/27/2023 08:13)    The images for the above \"link(s)\" were made available to me to review independently.  I also reviewed the radiologist's report described above with regard to the urologic findings. My interpretation is as follows:  Left renal calculus 8 mm in the pelvis.  He has some other stones in the lower pole.  /Rigo Stewart MD       ASSESSMENT AND PLAN          Problem List Items Addressed This Visit    None  Visit Diagnoses       Renal calculus, left    -  Primary    Relevant Orders    POC Urinalysis Dipstick, Multipro        The patient has a Left renal calculus.   Additional evaluation will include a postoperative kub to assess stone free status, stone analysis if patient able to strain for stone, and dietary assessment with potential recommendations for pharmacologic therapy as indicated. The radiographs, including size, location, and symptoms are discussed with the patient.  Options for management including ureteroscopic, open, and percutaneous approaches are explained to the patient.  The risks, benefits, and alternatives of this procedure have been discussed with the patient or the responsible party.  Mr. Ricks  has chosen to proceed with ESWL due to its less invasive approach combined with its effectiveness.  Understanding of the risks including damage to the kidney (even long term), inability to render stone free, need for ancillary procedures, bleeding, infection, steinstrasse, post op pain and " discomfort,  and possible arrhythmia is expressed by the patient.  Given the size and location of the stone, the patient will not need ureteral stent placement.  The patient also understands the spontaneous passage of the stone is an option, or even following the stone size and location with serial imaging.         FOLLOW UP     No follow-ups on file.        (Please note that portions of this note were completed with a voice recognition program.)  Pablo Mendez MA  09/12/23  12:38 CDT

## 2023-09-20 ENCOUNTER — TELEPHONE (OUTPATIENT)
Dept: UROLOGY | Facility: CLINIC | Age: 58
End: 2023-09-20
Payer: COMMERCIAL

## 2023-09-21 ENCOUNTER — OFFICE VISIT (OUTPATIENT)
Dept: UROLOGY | Facility: CLINIC | Age: 58
End: 2023-09-21
Payer: COMMERCIAL

## 2023-09-21 ENCOUNTER — TELEPHONE (OUTPATIENT)
Dept: UROLOGY | Facility: CLINIC | Age: 58
End: 2023-09-21
Payer: COMMERCIAL

## 2023-09-21 ENCOUNTER — HOSPITAL ENCOUNTER (OUTPATIENT)
Dept: GENERAL RADIOLOGY | Facility: HOSPITAL | Age: 58
Discharge: HOME | End: 2023-09-21
Admitting: UROLOGY
Payer: COMMERCIAL

## 2023-09-21 VITALS — BODY MASS INDEX: 26.53 KG/M2 | WEIGHT: 200.2 LBS | TEMPERATURE: 96.7 F | HEIGHT: 73 IN

## 2023-09-21 DIAGNOSIS — N20.0 RENAL CALCULUS, LEFT: ICD-10-CM

## 2023-09-21 DIAGNOSIS — N20.0 RENAL CALCULUS, LEFT: Primary | ICD-10-CM

## 2023-09-21 LAB
BILIRUB BLD-MCNC: NEGATIVE MG/DL
CLARITY, POC: CLEAR
COLOR UR: YELLOW
GLUCOSE UR STRIP-MCNC: NEGATIVE MG/DL
KETONES UR QL: NEGATIVE
LEUKOCYTE EST, POC: NEGATIVE
NITRITE UR-MCNC: NEGATIVE MG/ML
PH UR: 5.5 [PH] (ref 5–8)
PROT UR STRIP-MCNC: NEGATIVE MG/DL
RBC # UR STRIP: NEGATIVE /UL
SP GR UR: 1.02 (ref 1–1.03)
UROBILINOGEN UR QL: NORMAL

## 2023-09-21 PROCEDURE — 99213 OFFICE O/P EST LOW 20 MIN: CPT | Performed by: UROLOGY

## 2023-09-21 PROCEDURE — 81003 URINALYSIS AUTO W/O SCOPE: CPT | Performed by: UROLOGY

## 2023-09-21 PROCEDURE — 74018 RADEX ABDOMEN 1 VIEW: CPT

## 2023-09-21 NOTE — TELEPHONE ENCOUNTER
Called ins to verify coverage, pt has active coverage effect date of 01/01/2023 per [employer]  Call ref# [id number]

## 2023-10-03 ENCOUNTER — TELEPHONE (OUTPATIENT)
Dept: UROLOGY | Facility: CLINIC | Age: 58
End: 2023-10-03
Payer: COMMERCIAL

## 2023-10-03 DIAGNOSIS — N20.0 RENAL CALCULUS, LEFT: Primary | ICD-10-CM

## 2023-10-25 ENCOUNTER — PRE-ADMISSION TESTING (OUTPATIENT)
Dept: PREADMISSION TESTING | Facility: HOSPITAL | Age: 58
End: 2023-10-25
Payer: COMMERCIAL

## 2023-10-25 VITALS
OXYGEN SATURATION: 99 % | HEIGHT: 73 IN | RESPIRATION RATE: 18 BRPM | DIASTOLIC BLOOD PRESSURE: 106 MMHG | SYSTOLIC BLOOD PRESSURE: 175 MMHG | WEIGHT: 202.6 LBS | HEART RATE: 67 BPM | BODY MASS INDEX: 26.85 KG/M2

## 2023-10-25 LAB
DEPRECATED RDW RBC AUTO: 43.8 FL (ref 37–54)
ERYTHROCYTE [DISTWIDTH] IN BLOOD BY AUTOMATED COUNT: 12.7 % (ref 12.3–15.4)
HCT VFR BLD AUTO: 47.1 % (ref 37.5–51)
HGB BLD-MCNC: 15.6 G/DL (ref 13–17.7)
MCH RBC QN AUTO: 31 PG (ref 26.6–33)
MCHC RBC AUTO-ENTMCNC: 33.1 G/DL (ref 31.5–35.7)
MCV RBC AUTO: 93.6 FL (ref 79–97)
PLATELET # BLD AUTO: 181 10*3/MM3 (ref 140–450)
PMV BLD AUTO: 10.1 FL (ref 6–12)
RBC # BLD AUTO: 5.03 10*6/MM3 (ref 4.14–5.8)
WBC NRBC COR # BLD: 5.07 10*3/MM3 (ref 3.4–10.8)

## 2023-10-25 PROCEDURE — 36415 COLL VENOUS BLD VENIPUNCTURE: CPT

## 2023-10-25 PROCEDURE — 85027 COMPLETE CBC AUTOMATED: CPT

## 2023-10-25 NOTE — DISCHARGE INSTRUCTIONS
Before you come to the hospital        Arrival time: AS DIRECTED BY OFFICE     YOU MAY TAKE THE FOLLOWING MEDICATION(S) THE MORNING OF SURGERY WITH A SIP OF WATER: As directed by anesthesia           ALL OTHER HOME MEDICATION CHECK WITH YOUR PHYSICIAN (especially if   you are taking diabetes medicines or blood thinners)      If you were given and instructed to use a germ- killing soap, use as directed the night before surgery and again the morning of surgery or as directed by your surgeon. (Use one-half of the bottle with each shower.)   See attached information for How to Use Chlorhexidine for Bathing if applicable.            Eating and drinking restrictions prior to scheduled arrival time    2 Hours before arrival time STOP   Drinking Clear liquids (water, black coffee-NO CREAM,  apple juice-no pulp)      8 Hours before arrival time STOP   All food, full liquids, and dairy products    (It is extremely important that you follow these guidelines to prevent delay or cancelation of your procedure)     Clear Liquids  Water and flavored water                                                                      Clear Fruit juices, such as cranberry juice and apple juice.  Black coffee (NO cream of any kind, including powdered).  Plain tea  Clear bouillon or broth.  Flavored gelatin.  Soda.  Gatorade or Powerade.  Full liquid examples  Juices that have pulp.  Frozen ice pops that contain fruit pieces.  Coffee with creamer  Milk.  Yogurt.                MANAGING PAIN AFTER SURGERY    We know you are probably wondering what your pain will be like after surgery.  Following surgery it is unrealistic to expect you will not have pain.   Pain is how our bodies let us know that something is wrong or cautions us to be careful.  That said, our goal is to make your pain tolerable.    Methods we may use to treat your pain include (oral or IV medications, PCAs, epidurals, nerve blocks, etc.)   While some procedures require IV pain  medications for a short time after surgery, transitioning to pain medications by mouth allows for better management of pain.   Your nurse will encourage you to take oral pain medications whenever possible.  IV medications work almost immediately, but only last a short while.  Taking medications by mouth allows for a more constant level of medication in your blood stream for a longer period of time.      Once your pain is out of control it is harder to get back under control.  It is important you are aware when your next dose of pain medication is due.  If you are admitted, your nurse may write the time of your next dose on the white board in your room to help you remember.      We are interested in your pain and encourage you to inform us about aggravating factors during your visit.   Many times a simple repositioning every few hours can make a big difference.    If your physician says it is okay, do not let your pain prevent you from getting out of bed. Be sure to call your nurse for assistance prior to getting up so you do not fall.      Before surgery, please decide your tolerable pain goal.  These faces help describe the pain ratings we use on a 0-10 scale.   Be prepared to tell us your goal and whether or not you take pain or anxiety medications at home.          Preparing for Surgery  Preparing for surgery is an important part of your care. It can make things go more smoothly and help you avoid complications. The steps leading up to surgery may vary among hospitals. Follow all instructions given to you by your health care providers. Ask questions if you do not understand something. Talk about any concerns that you have.  Here are some questions to consider asking before your surgery:  If my surgery is not an emergency (is elective), when would be the best time to have the surgery?  What arrangements do I need to make for work, home, or school?  What will my recovery be like? How long will it be before I can  return to normal activities?  Will I need to prepare my home? Will I need to arrange care for me or my children?  Should I expect to have pain after surgery? What are my pain management options? Are there nonmedical options that I can try for pain?  Tell a health care provider about:  Any allergies you have.  All medicines you are taking, including vitamins, herbs, eye drops, creams, and over-the-counter medicines.  Any problems you or family members have had with anesthetic medicines.  Any blood disorders you have.  Any surgeries you have had.  Any medical conditions you have.  Whether you are pregnant or may be pregnant.  What are the risks?  The risks and complications of surgery depend on the specific procedure that you have. Discuss all the risks with your health care providers before your surgery. Ask about common surgical complications, which may include:  Infection.  Bleeding or a need for blood replacement (transfusion).  Allergic reactions to medicines.  Damage to surrounding nerves, tissues, or structures.  A blood clot.  Scarring.  Failure of the surgery to correct the problem.  Follow these instructions before the procedure:  Several days or weeks before your procedure  You may have a physical exam by your primary health care provider to make sure it is safe for you to have surgery.  You may have testing. This may include a chest X-ray, blood and urine tests, electrocardiogram (ECG), or other testing.  Ask your health care provider about:  Changing or stopping your regular medicines. This is especially important if you are taking diabetes medicines or blood thinners.  Taking medicines such as aspirin and ibuprofen. These medicines can thin your blood. Do not take these medicines unless your health care provider tells you to take them.  Taking over-the-counter medicines, vitamins, herbs, and supplements.  Do not use any products that contain nicotine or tobacco, such as cigarettes and e-cigarettes. If  you need help quitting, ask your health care provider.  Avoid alcohol.  Ask your health care provider if there are exercises you can do to prepare for surgery.  Eat a healthy diet.   Plan to have someone 18 years of age or older to take you home from the hospital. We will need to verify your ride on the morning of surgery if you are being discharged home on the same day. Tell your ride to be expecting a call from the hospital prior to your procedure.   Plan to have a responsible adult care for you for at least 24 hours after you leave the hospital or clinic. This is important.  The day before your procedure  You may be given antibiotic medicine to take by mouth to help prevent infection. Take it as told by your health care provider.  You may be asked to shower with a germ-killing soap.  Follow instructions from your health care provider about eating and drinking restrictions. This includes gum, mints and hard candy.  Pack comfortable clothes according to your procedure.   The day of your procedure  You may need to take another shower with a germ-killing soap before you leave home in the morning.  With a small sip of water, take only the medicines that you are told to take.  Remove all jewelry including rings.   Leave anything you consider valuable at home except hearing aids if needed.  You do not need to bring your home medications into the hospital.   Do not wear any makeup, nail polish, powder, deodorant, lotion, hair accessories, or anything on your skin or body except your clothes.  If you will be staying in the hospital, bring a case to hold your glasses, contacts, or dentures. You may also want to bring your robe and non-skid footwear.       (Do not use denture adhesives since you will be asked to remove them during  surgery).   If you wear oxygen at home, bring it with you the day of surgery.  If instructed by your health care provider, bring your sleep apnea device with you on the day of your surgery (if  this applies to you).  You may want to leave your suitcase and sleep apnea device in the car until after surgery.   Arrive at the hospital as scheduled.  Bring a friend or family member with you who can help to answer questions and be present while you meet with your health care provider.  At the hospital  When you arrive at the hospital:  Go to registration located at the main entrance of the hospital. You will be registered and given a beeper and a sticker sheet. Take the stickers to the Outpatient nurses desk and place in the black tray. This is to notify staff that you have arrived. Then return to the lobby to wait.   When your beeper lights up and vibrates proceed through the double doors, under the stairs, and a member of the Outpatient Surgery staff will escort you to your preoperative room.  You may have to wear compression sleeves. These help to prevent blood clots and reduce swelling in your legs.  An IV may be inserted into one of your veins.              In the operating room, you may be given one or more of the following:        A medicine to help you relax (sedative).        A medicine to numb the area (local anesthetic).        A medicine to make you fall asleep (general anesthetic).        A medicine that is injected into an area of your body to numb everything below the                      injection site (regional anesthetic).  You may be given an antibiotic through your IV to help prevent infection.  Your surgical site will be marked or identified.    Contact a health care provider if you:  Develop a fever of more than 100.4°F (38°C) or other feelings of illness during the 48 hours before your surgery.  Have symptoms that get worse.  Have questions or concerns about your surgery.  Summary  Preparing for surgery can make the procedure go more smoothly and lower your risk of complications.  Before surgery, make a list of questions and concerns to discuss with your surgeon. Ask about the risks and  possible complications.  In the days or weeks before your surgery, follow all instructions from your health care provider. You may need to stop smoking, avoid alcohol, follow eating restrictions, and change or stop your regular medicines.  Contact your surgeon if you develop a fever or other signs of illness during the few days before your surgery.  This information is not intended to replace advice given to you by your health care provider. Make sure you discuss any questions you have with your health care provider.  Document Revised: 12/21/2018 Document Reviewed: 10/23/2018  [employer] Patient Education © 2021 [employer]

## 2023-10-31 ENCOUNTER — TELEPHONE (OUTPATIENT)
Dept: UROLOGY | Facility: CLINIC | Age: 58
End: 2023-10-31
Payer: COMMERCIAL

## 2023-10-31 NOTE — TELEPHONE ENCOUNTER
Called patient to remind them to arrive at patient registration on 11/1 at 5AM for the procedure with Dr. Stewart. Spoke with patient. Told patient if they had any questions to please contact our office at [phone].

## 2023-11-01 ENCOUNTER — ANESTHESIA (OUTPATIENT)
Dept: PERIOP | Facility: HOSPITAL | Age: 58
End: 2023-11-01
Payer: COMMERCIAL

## 2023-11-01 ENCOUNTER — APPOINTMENT (OUTPATIENT)
Dept: GENERAL RADIOLOGY | Facility: HOSPITAL | Age: 58
End: 2023-11-01
Payer: COMMERCIAL

## 2023-11-01 ENCOUNTER — HOSPITAL ENCOUNTER (OUTPATIENT)
Facility: HOSPITAL | Age: 58
Setting detail: HOSPITAL OUTPATIENT SURGERY
Discharge: HOME | End: 2023-11-01
Attending: UROLOGY | Admitting: UROLOGY
Payer: COMMERCIAL

## 2023-11-01 ENCOUNTER — ANESTHESIA EVENT (OUTPATIENT)
Dept: PERIOP | Facility: HOSPITAL | Age: 58
End: 2023-11-01
Payer: COMMERCIAL

## 2023-11-01 VITALS
DIASTOLIC BLOOD PRESSURE: 86 MMHG | OXYGEN SATURATION: 96 % | RESPIRATION RATE: 18 BRPM | HEART RATE: 54 BPM | TEMPERATURE: 98.3 F | SYSTOLIC BLOOD PRESSURE: 111 MMHG

## 2023-11-01 DIAGNOSIS — N20.0 KIDNEY STONE: Primary | ICD-10-CM

## 2023-11-01 PROCEDURE — 25010000002 DROPERIDOL PER 5 MG: Performed by: NURSE ANESTHETIST, CERTIFIED REGISTERED

## 2023-11-01 PROCEDURE — 25010000002 FENTANYL CITRATE (PF) 50 MCG/ML SOLUTION: Performed by: ANESTHESIOLOGY

## 2023-11-01 PROCEDURE — 74018 RADEX ABDOMEN 1 VIEW: CPT

## 2023-11-01 PROCEDURE — 25010000002 PROPOFOL 10 MG/ML EMULSION: Performed by: NURSE ANESTHETIST, CERTIFIED REGISTERED

## 2023-11-01 PROCEDURE — 25010000002 CEFAZOLIN PER 500 MG: Performed by: UROLOGY

## 2023-11-01 PROCEDURE — 25810000003 LACTATED RINGERS PER 1000 ML: Performed by: UROLOGY

## 2023-11-01 RX ORDER — DEXTROSE MONOHYDRATE 25 G/50ML
12.5 INJECTION, SOLUTION INTRAVENOUS AS NEEDED
Status: DISCONTINUED | OUTPATIENT
Start: 2023-11-01 | End: 2023-11-01 | Stop reason: HOSPADM

## 2023-11-01 RX ORDER — HYDROCODONE BITARTRATE AND ACETAMINOPHEN 5; 325 MG/1; MG/1
1 TABLET ORAL ONCE AS NEEDED
Status: DISCONTINUED | OUTPATIENT
Start: 2023-11-01 | End: 2023-11-01 | Stop reason: HOSPADM

## 2023-11-01 RX ORDER — FENTANYL CITRATE 50 UG/ML
25 INJECTION, SOLUTION INTRAMUSCULAR; INTRAVENOUS
Status: DISCONTINUED | OUTPATIENT
Start: 2023-11-01 | End: 2023-11-01 | Stop reason: HOSPADM

## 2023-11-01 RX ORDER — SODIUM CHLORIDE 0.9 % (FLUSH) 0.9 %
3 SYRINGE (ML) INJECTION AS NEEDED
Status: DISCONTINUED | OUTPATIENT
Start: 2023-11-01 | End: 2023-11-01 | Stop reason: HOSPADM

## 2023-11-01 RX ORDER — IBUPROFEN 600 MG/1
600 TABLET ORAL ONCE AS NEEDED
Status: DISCONTINUED | OUTPATIENT
Start: 2023-11-01 | End: 2023-11-01 | Stop reason: HOSPADM

## 2023-11-01 RX ORDER — SODIUM CHLORIDE 0.9 % (FLUSH) 0.9 %
10 SYRINGE (ML) INJECTION EVERY 12 HOURS SCHEDULED
Status: DISCONTINUED | OUTPATIENT
Start: 2023-11-01 | End: 2023-11-01 | Stop reason: HOSPADM

## 2023-11-01 RX ORDER — HYDROCODONE BITARTRATE AND ACETAMINOPHEN 10; 325 MG/1; MG/1
1 TABLET ORAL EVERY 4 HOURS PRN
Status: DISCONTINUED | OUTPATIENT
Start: 2023-11-01 | End: 2023-11-01 | Stop reason: HOSPADM

## 2023-11-01 RX ORDER — HYDROCODONE BITARTRATE AND ACETAMINOPHEN 5; 325 MG/1; MG/1
1 TABLET ORAL EVERY 6 HOURS PRN
Qty: 12 TABLET | Refills: 0 | Status: SHIPPED | OUTPATIENT
Start: 2023-11-01 | End: 2023-11-04

## 2023-11-01 RX ORDER — LABETALOL HYDROCHLORIDE 5 MG/ML
5 INJECTION, SOLUTION INTRAVENOUS
Status: DISCONTINUED | OUTPATIENT
Start: 2023-11-01 | End: 2023-11-01 | Stop reason: HOSPADM

## 2023-11-01 RX ORDER — DROPERIDOL 2.5 MG/ML
0.62 INJECTION, SOLUTION INTRAMUSCULAR; INTRAVENOUS ONCE AS NEEDED
Status: DISCONTINUED | OUTPATIENT
Start: 2023-11-01 | End: 2023-11-01 | Stop reason: HOSPADM

## 2023-11-01 RX ORDER — ONDANSETRON 4 MG/1
4 TABLET, FILM COATED ORAL ONCE AS NEEDED
Status: DISCONTINUED | OUTPATIENT
Start: 2023-11-01 | End: 2023-11-01 | Stop reason: HOSPADM

## 2023-11-01 RX ORDER — ONDANSETRON 2 MG/ML
4 INJECTION INTRAMUSCULAR; INTRAVENOUS ONCE AS NEEDED
Status: DISCONTINUED | OUTPATIENT
Start: 2023-11-01 | End: 2023-11-01 | Stop reason: HOSPADM

## 2023-11-01 RX ORDER — LIDOCAINE HYDROCHLORIDE 10 MG/ML
0.5 INJECTION, SOLUTION EPIDURAL; INFILTRATION; INTRACAUDAL; PERINEURAL ONCE AS NEEDED
Status: DISCONTINUED | OUTPATIENT
Start: 2023-11-01 | End: 2023-11-01 | Stop reason: HOSPADM

## 2023-11-01 RX ORDER — SODIUM CHLORIDE 0.9 % (FLUSH) 0.9 %
10 SYRINGE (ML) INJECTION AS NEEDED
Status: DISCONTINUED | OUTPATIENT
Start: 2023-11-01 | End: 2023-11-01 | Stop reason: HOSPADM

## 2023-11-01 RX ORDER — SODIUM CHLORIDE, SODIUM LACTATE, POTASSIUM CHLORIDE, CALCIUM CHLORIDE 600; 310; 30; 20 MG/100ML; MG/100ML; MG/100ML; MG/100ML
1000 INJECTION, SOLUTION INTRAVENOUS CONTINUOUS
Status: DISCONTINUED | OUTPATIENT
Start: 2023-11-01 | End: 2023-11-01 | Stop reason: HOSPADM

## 2023-11-01 RX ORDER — PROPOFOL 10 MG/ML
VIAL (ML) INTRAVENOUS AS NEEDED
Status: DISCONTINUED | OUTPATIENT
Start: 2023-11-01 | End: 2023-11-01 | Stop reason: SURG

## 2023-11-01 RX ORDER — DROPERIDOL 2.5 MG/ML
INJECTION, SOLUTION INTRAMUSCULAR; INTRAVENOUS AS NEEDED
Status: DISCONTINUED | OUTPATIENT
Start: 2023-11-01 | End: 2023-11-01 | Stop reason: SURG

## 2023-11-01 RX ORDER — NALOXONE HCL 0.4 MG/ML
0.4 VIAL (ML) INJECTION AS NEEDED
Status: DISCONTINUED | OUTPATIENT
Start: 2023-11-01 | End: 2023-11-01 | Stop reason: HOSPADM

## 2023-11-01 RX ORDER — SODIUM CHLORIDE, SODIUM LACTATE, POTASSIUM CHLORIDE, CALCIUM CHLORIDE 600; 310; 30; 20 MG/100ML; MG/100ML; MG/100ML; MG/100ML
9 INJECTION, SOLUTION INTRAVENOUS CONTINUOUS
Status: DISCONTINUED | OUTPATIENT
Start: 2023-11-01 | End: 2023-11-01 | Stop reason: HOSPADM

## 2023-11-01 RX ORDER — MIDAZOLAM HYDROCHLORIDE 1 MG/ML
1 INJECTION INTRAMUSCULAR; INTRAVENOUS
Status: DISCONTINUED | OUTPATIENT
Start: 2023-11-01 | End: 2023-11-01 | Stop reason: HOSPADM

## 2023-11-01 RX ORDER — FLUMAZENIL 0.1 MG/ML
0.2 INJECTION INTRAVENOUS AS NEEDED
Status: DISCONTINUED | OUTPATIENT
Start: 2023-11-01 | End: 2023-11-01 | Stop reason: HOSPADM

## 2023-11-01 RX ORDER — LIDOCAINE HYDROCHLORIDE 20 MG/ML
INJECTION, SOLUTION EPIDURAL; INFILTRATION; INTRACAUDAL; PERINEURAL AS NEEDED
Status: DISCONTINUED | OUTPATIENT
Start: 2023-11-01 | End: 2023-11-01 | Stop reason: SURG

## 2023-11-01 RX ADMIN — PROPOFOL INJECTABLE EMULSION 200 MG: 10 INJECTION, EMULSION INTRAVENOUS at 07:17

## 2023-11-01 RX ADMIN — FENTANYL CITRATE 100 MCG: 50 INJECTION INTRAMUSCULAR; INTRAVENOUS at 07:17

## 2023-11-01 RX ADMIN — SODIUM CHLORIDE, POTASSIUM CHLORIDE, SODIUM LACTATE AND CALCIUM CHLORIDE 1000 ML: 600; 310; 30; 20 INJECTION, SOLUTION INTRAVENOUS at 06:11

## 2023-11-01 RX ADMIN — DROPERIDOL 1.25 MG: 2.5 INJECTION, SOLUTION INTRAMUSCULAR; INTRAVENOUS at 07:17

## 2023-11-01 RX ADMIN — CEFAZOLIN 2 G: 2 INJECTION, POWDER, FOR SOLUTION INTRAMUSCULAR; INTRAVENOUS at 07:15

## 2023-11-01 RX ADMIN — LIDOCAINE HYDROCHLORIDE 200 MG: 20 INJECTION, SOLUTION EPIDURAL; INFILTRATION; INTRACAUDAL; PERINEURAL at 07:17

## 2023-11-01 NOTE — ANESTHESIA PREPROCEDURE EVALUATION
Anesthesia Evaluation     Patient summary reviewed   NPO Solid Status: > 8 hours             Airway   Mallampati: II  Dental      Pulmonary    (-) COPD, asthma, sleep apnea, not a smoker  Cardiovascular   Exercise tolerance: excellent (>7 METS)    (-) pacemaker, past MI, angina, cardiac stents      Neuro/Psych  (-) seizures, TIA, CVA  GI/Hepatic/Renal/Endo    (+) renal disease- stones  (-) GERD, liver disease, diabetes    Musculoskeletal     Abdominal    Substance History      OB/GYN          Other                    Anesthesia Plan    ASA 2     general     intravenous induction     Anesthetic plan, risks, benefits, and alternatives have been provided, discussed and informed consent has been obtained with: patient.    CODE STATUS:

## 2023-11-01 NOTE — ANESTHESIA POSTPROCEDURE EVALUATION
Patient: Shon Ricks    Procedure Summary       Date: 11/01/23 Room / Location: [facility] OR  /  [facility]    Anesthesia Start: 0715 Anesthesia Stop: 0807    Procedure: EXTRACORPOREAL SHOCKWAVE LITHOTRIPSY (Left) Diagnosis:       Renal calculus, left      (Renal calculus, left [N20.0])    Surgeons: Rigo Stewart MD Provider: Jose Figueredo CRNA    Anesthesia Type: general ASA Status: 2            Anesthesia Type: general    Vitals  Vitals Value Taken Time   /98 11/01/23 0830   Temp 98.3 °F (36.8 °C) 11/01/23 0807   Pulse 61 11/01/23 0830   Resp 16 11/01/23 0822   SpO2 95 % 11/01/23 0830   Vitals shown include unfiled device data.        Post Anesthesia Care and Evaluation    Patient location during evaluation: PACU  Patient participation: complete - patient participated  Level of consciousness: awake and alert  Pain management: adequate    Airway patency: patent  Anesthetic complications: No anesthetic complications    Cardiovascular status: acceptable  Respiratory status: acceptable  Hydration status: acceptable    Comments: Blood pressure 137/97, pulse 58, temperature 98.3 °F (36.8 °C), temperature source Temporal, resp. rate 16, SpO2 97%.    Pt discharged from PACU based on bill score >8

## 2023-11-01 NOTE — ANESTHESIA PROCEDURE NOTES
Airway  Urgency: elective    Date/Time: 11/1/2023 7:17 AM  Airway not difficult    General Information and Staff    Patient location during procedure: OR  CRNA/CAA: Jose Figueredo CRNA    Indications and Patient Condition  Indications for airway management: airway protection    Preoxygenated: yes  MILS maintained throughout  Mask difficulty assessment: 1 - vent by mask    Final Airway Details  Final airway type: supraglottic airway      Successful airway: unique  Size 5     Number of attempts at approach: 1  Assessment: lips, teeth, and gum same as pre-op and atraumatic intubation

## 2023-11-01 NOTE — OP NOTE
OP NOTE  Shon Ricks    Date of Procedure: 11/1/2023    Pre-op Diagnosis:   Renal calculus, left [N20.0]    Post-op Diagnosis:     Post-Op Diagnosis Codes:     * Renal calculus, left [N20.0]    Procedure/CPT® Codes:      Procedure(s):  LEFT EXTRACORPOREAL SHOCKWAVE LITHOTRIPSY    Surgeon(s):  Rigo Stewart MD    Anesthesia: General    Staff:   Circulator: Fariha Urbano RN  Scrub Person: Kristan Barros  Vendor Representative: Angel Pace    Indications:    After discussion of options, patient has given informed consent to undergo left ESWL.  All risks, benefits, and alternatives were discussed.  He has left renal calculi including 8 mm stone in the inferior portion left pelvis.    Operative Report: The patient is identified. The KUB is reviewed. After answering any questions, patient was brought to the operating room and placed on the patient table of the [facility].  General endotracheal anesthesia was administered.  Preoperative KUB was reviewed.   An [facility] c-arm fluoroscope was used to identify the stone.    The shock-head is brought into position.  This stone is brought into the F2 plane for focus.  Treatment was initiated.  We gradually increased the energy level from 1 to 7.  This stone was treated at a rate of 90.  We paused for 2 minutes after 300 shocks to reduce the risk of renal damage.  The stone was periodically checked to make sure that we were on it and getting good breakup.  We checked at 700, 1000, 1500, 2000, and 2500 shocks.  The stone did appear to have good breakup.  At 2000 shocks we did move from the                                             renal pelvic stone which appeared completely                                                                                                                      fragmented to the lower pole stone                                                                                             which was used to complete procedure.                                                                                         I felt it was unnecessary to place a ureteral stent.  At the conclusion of 3000 shocks, the patient was awakened from anesthesia and transferred to the recovery room in satisfactory condition.      Estimated Blood Loss: <30 mL    Specimens:                None      Drains:   [REMOVED] Ureteral Drain/Stent Left ureter 6 Fr. (Removed)       Complications: none  Plan: Repeat KUB in a few weeks. Home today if no evidence of infection or significant bleeding.     Rigo Stewart MD     Date: 11/1/2023  Time: 08:27 CDT

## 2023-11-01 NOTE — H&P
Urology H&P    Mr. Ricks is 57 y.o. male    CHIEF COMPLAINT: Left renal calculus    HPI  Left renal calculus here for ESWL    The following portions of the patient's history were reviewed and updated as appropriate: allergies, current medications, past family history, past medical history, past social history, past surgical history and problem list.    Review of Systems   All other systems reviewed and are negative.        No medications prior to admission.         Current Facility-Administered Medications:     dextrose (D50W) (25 g/50 mL) IV injection 12.5 g, 12.5 g, Intravenous, PRN, Jorge Alberto Alva MD    fentaNYL citrate (PF) (SUBLIMAZE) injection 25 mcg, 25 mcg, Intravenous, Q5 Min PRN, Jorge Alberto Alva MD    lactated ringers infusion 1,000 mL, 1,000 mL, Intravenous, Continuous, Rigo Stewart MD, Last Rate: 25 mL/hr at 11/01/23 0611, 1,000 mL at 11/01/23 0611    lactated ringers infusion, 9 mL/hr, Intravenous, Continuous, Jorge Alberto Alva MD    lidocaine PF 1% (XYLOCAINE) injection 0.5 mL, 0.5 mL, Intradermal, Once PRN, Rigo Stewart MD    midazolam (VERSED) injection 1 mg, 1 mg, Intravenous, Q10 Min PRN, Jorge Alberto Alva MD    sodium chloride 0.9 % flush 10 mL, 10 mL, Intravenous, Q12H, Jorge Alberto Alva MD    sodium chloride 0.9 % flush 10 mL, 10 mL, Intravenous, PRN, Jorge Alberto Alva MD    sodium chloride 0.9 % flush 3 mL, 3 mL, Intravenous, PRN, Rigo Stewart MD    Past Medical History:   Diagnosis Date    Kidney stone 2007    First occurrence, second 2016    Kidney stone 02/16/2023       Past Surgical History:   Procedure Laterality Date    CYSTOSCOPY W/ URETERAL STENT PLACEMENT Left 2/20/2023    Procedure: CYSTOSCOPY URETERAL CATHETER/STENT INSERTION;  Surgeon: Rigo Stewart MD;  Location: [facility];  Service: Urology;  Laterality: Left;    EXTRACORPOREAL SHOCKWAVE LITHOTRIPSY (ESWL), STENT INSERTION/REMOVAL Left 2/20/2023     "Procedure: EXTRACORPOREAL SHOCKWAVE LITHOTRIPSY LEFT, CYSTOSCOPY WITH LEFT URETERAL STENT INSERTION;  Surgeon: Rigo Stewart MD;  Location: [facility];  Service: Urology;  Laterality: Left;    HERNIA REPAIR         Social History     Socioeconomic History    Marital status:    Tobacco Use    Smoking status: Never    Smokeless tobacco: Former     Types: Chew   Vaping Use    Vaping Use: Never used   Substance and Sexual Activity    Alcohol use: Never    Drug use: Never    Sexual activity: Yes     Partners: Female     Birth control/protection: Post-menopausal       History reviewed. No pertinent family history.    BP (!) 154/107   Pulse 65   Temp 97.7 °F (36.5 °C) (Temporal)   Resp 16   SpO2 98%     Physical Exam  Constitutional:   Temp:  [97.7 °F (36.5 °C)] 97.7 °F (36.5 °C)  Heart Rate:  [65-77] 65  Resp:  [16] 16  BP: (154-162)/(107-117) 154/107  ] Well developed, well nourished, no distress  Respiratory:   Effort unlabored; Movements symmetric  GI:   No mass or hernia noted, not distended or tender   No enlargement of spleen or liver noted  Skin:   No pallor or cyanosis; No obvious rash  Psych:   Alert, Oriented x 3         No results found for: \"GLUCOSE\", \"BUN\", \"CREATININE\", \"EGFRIFNONA\", \"EGFRIFAFRI\", \"BCR\", \"POTASSIUM\", \"CO2\", \"CALCIUM\", \"PROTENTOTREF\", \"ALBUMIN\", \"LABIL2\", \"BILIRUBIN\", \"AST\", \"ALT\"  No results found for: \"GLUCOSE\", \"CALCIUM\", \"NA\", \"K\", \"CO2\", \"CL\", \"BUN\", \"CREATININE\", \"EGFRIFAFRI\", \"EGFRIFNONA\", \"BCR\", \"ANIONGAP\"  Lab Results   Component Value Date    WBC 5.07 10/25/2023    HGB 15.6 10/25/2023    HCT 47.1 10/25/2023    MCV 93.6 10/25/2023     10/25/2023     No results found for: \"PSA\"  No results found for: \"URINECX\"  Brief Urine Lab Results  (Last result in the past 365 days)        Color   Clarity   Blood   Leuk Est   Nitrite   Protein   CREAT   Urine HCG        09/21/23 0818 Yellow   Clear   Negative   Negative   Negative   Negative                     Imaging Results " (Last 7 Days)       Procedure Component Value Units Date/Time    XR Abdomen KUB [[id number]] Collected: 11/01/23 0612     Updated: 11/01/23 0616    Narrative:      EXAMINATION: XR ABDOMEN KUB-     11/1/2023 5:45 AM CDT     HISTORY: UROLITHIASIS     Frontal projection of the abdomen is compared with the previous study  dated 9/21/2023.     There is moderate gas and stool in the colon. There is no evidence of  dilatation small or large bowel loops.     Both kidneys are partly obscured by the bowel contents. There are  several calculi in the mid to lower portion of the left kidney. The  largest calculus measures 8 mm in greatest dimension similar to the  previous study. No calculi in the area of the right kidney noted.     Moderate chronic degenerative changes of the lumbar spine are seen.       Impression:      1. Left nephrolithiasis.  2. Nonspecific abdominal gas pattern.     This report was signed and finalized on 11/1/2023 6:13 AM CDT by Dr. Ana Jones MD.               Assessment and Plan  Left renal calculus  Here for left eswl      (Please note that portions of this note were completed with a voice recognition program.)  Rigo Stewart MD  11/01/23  07:07 CDT

## 2023-11-29 NOTE — PROGRESS NOTES
"UROLOGY  POD # 41    Procedure:   11/1/2023  - LEFT EXTRACORPOREAL SHOCKWAVE LITHOTRIPSY     Symptoms: No flank pain      Current Outpatient Medications:     lisinopril (PRINIVIL,ZESTRIL) 5 MG tablet, Take 1 tablet by mouth Daily., Disp: , Rfl:     Temp 96.7 °F (35.9 °C)   Ht 185.4 cm (73\")   Wt 92.2 kg (203 lb 3.2 oz)   BMI 26.81 kg/m²         Data:   XR abdomen kub (12/11/2023 09:30)  No significant stone fragments noted     ASSESSMENT AND PLAN          Problem List Items Addressed This Visit    None  Visit Diagnoses       Renal calculus, left    -  Primary    Relevant Orders    POC Urinalysis Dipstick, Multipro (Completed)    XR Abdomen KUB            Return in about 1 year (around 12/11/2024) for KUB before visit.      Rigo Stewart MD  12/12/2023  16:59 CST    "

## 2023-12-11 ENCOUNTER — HOSPITAL ENCOUNTER (OUTPATIENT)
Dept: GENERAL RADIOLOGY | Facility: HOSPITAL | Age: 58
Discharge: HOME | End: 2023-12-11
Admitting: UROLOGY
Payer: COMMERCIAL

## 2023-12-11 ENCOUNTER — OFFICE VISIT (OUTPATIENT)
Dept: UROLOGY | Facility: CLINIC | Age: 58
End: 2023-12-11
Payer: COMMERCIAL

## 2023-12-11 ENCOUNTER — TELEPHONE (OUTPATIENT)
Dept: UROLOGY | Facility: CLINIC | Age: 58
End: 2023-12-11
Payer: COMMERCIAL

## 2023-12-11 VITALS — HEIGHT: 73 IN | BODY MASS INDEX: 26.93 KG/M2 | TEMPERATURE: 96.7 F | WEIGHT: 203.2 LBS

## 2023-12-11 DIAGNOSIS — N20.0 RENAL CALCULUS, LEFT: Primary | ICD-10-CM

## 2023-12-11 DIAGNOSIS — N20.0 RENAL CALCULUS, LEFT: ICD-10-CM

## 2023-12-11 LAB
BILIRUB BLD-MCNC: NEGATIVE MG/DL
CLARITY, POC: CLEAR
COLOR UR: YELLOW
GLUCOSE UR STRIP-MCNC: NEGATIVE MG/DL
KETONES UR QL: NEGATIVE
LEUKOCYTE EST, POC: NEGATIVE
NITRITE UR-MCNC: NEGATIVE MG/ML
PH UR: 5.5 [PH] (ref 5–8)
PROT UR STRIP-MCNC: NEGATIVE MG/DL
RBC # UR STRIP: NEGATIVE /UL
SP GR UR: 1.03 (ref 1–1.03)
UROBILINOGEN UR QL: NORMAL

## 2023-12-11 PROCEDURE — 74018 RADEX ABDOMEN 1 VIEW: CPT

## 2023-12-11 PROCEDURE — 99024 POSTOP FOLLOW-UP VISIT: CPT | Performed by: UROLOGY

## 2023-12-11 PROCEDURE — 81001 URINALYSIS AUTO W/SCOPE: CPT | Performed by: UROLOGY

## 2023-12-11 RX ORDER — LISINOPRIL 5 MG/1
1 TABLET ORAL DAILY
COMMUNITY
Start: 2023-11-06

## (undated) DEVICE — POLYVINYL UROLOGICAL DRAINAGE BAG FOR SIEMENS SYSTEMS - NON-STERILE: Brand: CUSTOM MEDICAL SPECIALTIES, INC.

## (undated) DEVICE — 1016 S-DRAPE IRRIG POUCH 10/BOX: Brand: STERI-DRAPE™

## (undated) DEVICE — GLV SURG BIOGEL M LTX PF 8

## (undated) DEVICE — GOWN,NON-REINFORCED,SIRUS,SET IN SLV,XXL: Brand: MEDLINE

## (undated) DEVICE — TBG DRN URINARY 9/32IN

## (undated) DEVICE — PK CYSTO 30

## (undated) DEVICE — GUARDIAN LVC: Brand: GUARDIAN

## (undated) DEVICE — BAPTIST TURNOVER KIT: Brand: MEDLINE INDUSTRIES, INC.